# Patient Record
Sex: MALE | Race: WHITE | NOT HISPANIC OR LATINO | URBAN - METROPOLITAN AREA
[De-identification: names, ages, dates, MRNs, and addresses within clinical notes are randomized per-mention and may not be internally consistent; named-entity substitution may affect disease eponyms.]

---

## 2017-01-07 ENCOUNTER — EMERGENCY (EMERGENCY)
Facility: HOSPITAL | Age: 32
LOS: 1 days | Discharge: HOME | End: 2017-01-07
Attending: EMERGENCY MEDICINE
Payer: SELF-PAY

## 2017-01-07 VITALS
DIASTOLIC BLOOD PRESSURE: 87 MMHG | WEIGHT: 229.94 LBS | RESPIRATION RATE: 18 BRPM | TEMPERATURE: 98 F | HEART RATE: 142 BPM | OXYGEN SATURATION: 99 % | HEIGHT: 69 IN | SYSTOLIC BLOOD PRESSURE: 151 MMHG

## 2017-01-07 VITALS — HEART RATE: 86 BPM | RESPIRATION RATE: 16 BRPM | OXYGEN SATURATION: 99 % | TEMPERATURE: 98 F

## 2017-01-07 DIAGNOSIS — R06.02 SHORTNESS OF BREATH: ICD-10-CM

## 2017-01-07 DIAGNOSIS — R00.2 PALPITATIONS: ICD-10-CM

## 2017-01-07 LAB — PCP SPEC-MCNC: SIGNIFICANT CHANGE UP

## 2017-01-07 PROCEDURE — 93005 ELECTROCARDIOGRAM TRACING: CPT

## 2017-01-07 PROCEDURE — 80307 DRUG TEST PRSMV CHEM ANLYZR: CPT

## 2017-01-07 PROCEDURE — 99053 MED SERV 10PM-8AM 24 HR FAC: CPT

## 2017-01-07 PROCEDURE — 71020: CPT | Mod: 26

## 2017-01-07 PROCEDURE — 71046 X-RAY EXAM CHEST 2 VIEWS: CPT

## 2017-01-07 PROCEDURE — 99284 EMERGENCY DEPT VISIT MOD MDM: CPT

## 2017-01-07 PROCEDURE — 36415 COLL VENOUS BLD VENIPUNCTURE: CPT

## 2017-01-07 PROCEDURE — 99284 EMERGENCY DEPT VISIT MOD MDM: CPT | Mod: 25

## 2017-01-07 RX ORDER — SODIUM CHLORIDE 9 MG/ML
1000 INJECTION INTRAMUSCULAR; INTRAVENOUS; SUBCUTANEOUS ONCE
Qty: 0 | Refills: 0 | Status: COMPLETED | OUTPATIENT
Start: 2017-01-07 | End: 2017-01-07

## 2017-01-07 NOTE — ED PROVIDER NOTE - NS ED MD SCRIBE ATTENDING SCRIBE SECTIONS
HISTORY OF PRESENT ILLNESS/PAST MEDICAL/SURGICAL/SOCIAL HISTORY/REVIEW OF SYSTEMS/VITAL SIGNS( Pullset)/PHYSICAL EXAM/DISPOSITION/HIV

## 2017-01-07 NOTE — ED ADULT NURSE NOTE - CHPI ED SYMPTOMS NEG
no pulling at ears/no dyspnea on exertion/no cough/no chest pain/no decreased eating/drinking/no fussiness/no body aches/no edema/no mucus production/no myalgias/no headache/no crying/no nasal congestion/no chest wall tenderness/no fever/no hemoptysis/no sleeping issues/no chest congestion/no wheezing/no paroxysmal nocturnal dyspnea/no malaise/no difficulty breathing

## 2017-01-07 NOTE — ED PROVIDER NOTE - MEDICAL DECISION MAKING DETAILS
Pt with panic attack today, will check XR, give IVF, ECG indicates sinus tachy, check Utox, then reassess. Pt with panic attack today. had alcohol to drink. appears dehydrated on exam. will check XR, give IVF, ECG indicates sinus tachy, check Utox, then reassess. cxr clear. repeat vs after po hydration improved. Discussed anticipatory guidance and return precautions. Dc with outpt follow up.

## 2017-01-07 NOTE — ED PROVIDER NOTE - OBJECTIVE STATEMENT
30 y/o male with no PMHx presents to the ED for SOB and palpitations x 1 week. Pt states he has been having palpitations today, but not feeling anxious. Pt denies fever, chills, cough, CP, or any other complaints. NKDA.

## 2021-12-17 NOTE — ED PROVIDER NOTE - CARDIAC RHYTHM
[No Acute Distress] : no acute distress [Well Nourished] : well nourished [Well Developed] : well developed [Well-Appearing] : well-appearing [Normal Sclera/Conjunctiva] : normal sclera/conjunctiva [PERRL] : pupils equal round and reactive to light [EOMI] : extraocular movements intact [Normal Outer Ear/Nose] : the outer ears and nose were normal in appearance [Normal Oropharynx] : the oropharynx was normal [No JVD] : no jugular venous distention [No Lymphadenopathy] : no lymphadenopathy [Supple] : supple [Thyroid Normal, No Nodules] : the thyroid was normal and there were no nodules present [No Respiratory Distress] : no respiratory distress  [No Accessory Muscle Use] : no accessory muscle use [Clear to Auscultation] : lungs were clear to auscultation bilaterally [Normal Rate] : normal rate  [Regular Rhythm] : with a regular rhythm [Normal S1, S2] : normal S1 and S2 [No Murmur] : no murmur heard [No Carotid Bruits] : no carotid bruits [No Abdominal Bruit] : a ~M bruit was not heard ~T in the abdomen [No Varicosities] : no varicosities regular [Pedal Pulses Present] : the pedal pulses are present [No Edema] : there was no peripheral edema [No Palpable Aorta] : no palpable aorta [No Extremity Clubbing/Cyanosis] : no extremity clubbing/cyanosis [Soft] : abdomen soft [Non Tender] : non-tender [Non-distended] : non-distended [No Masses] : no abdominal mass palpated [No HSM] : no HSM [Normal Bowel Sounds] : normal bowel sounds [Normal Posterior Cervical Nodes] : no posterior cervical lymphadenopathy [Normal Anterior Cervical Nodes] : no anterior cervical lymphadenopathy [No CVA Tenderness] : no CVA  tenderness [No Spinal Tenderness] : no spinal tenderness [No Joint Swelling] : no joint swelling [Grossly Normal Strength/Tone] : grossly normal strength/tone [No Rash] : no rash [Coordination Grossly Intact] : coordination grossly intact [No Focal Deficits] : no focal deficits [Normal Gait] : normal gait [Deep Tendon Reflexes (DTR)] : deep tendon reflexes were 2+ and symmetric [Normal Affect] : the affect was normal [Normal Insight/Judgement] : insight and judgment were intact